# Patient Record
Sex: MALE | Race: WHITE | NOT HISPANIC OR LATINO
[De-identification: names, ages, dates, MRNs, and addresses within clinical notes are randomized per-mention and may not be internally consistent; named-entity substitution may affect disease eponyms.]

---

## 2019-12-27 ENCOUNTER — APPOINTMENT (OUTPATIENT)
Dept: ORTHOPEDIC SURGERY | Facility: CLINIC | Age: 64
End: 2019-12-27
Payer: COMMERCIAL

## 2019-12-27 VITALS — HEIGHT: 69 IN | BODY MASS INDEX: 23.4 KG/M2 | WEIGHT: 158 LBS

## 2019-12-27 PROCEDURE — 99203 OFFICE O/P NEW LOW 30 MIN: CPT

## 2019-12-27 PROCEDURE — 72040 X-RAY EXAM NECK SPINE 2-3 VW: CPT

## 2019-12-27 RX ORDER — METHYLPREDNISOLONE 4 MG/1
4 TABLET ORAL
Qty: 1 | Refills: 0 | Status: ACTIVE | COMMUNITY
Start: 2019-12-27 | End: 1900-01-01

## 2019-12-27 NOTE — DISCUSSION/SUMMARY
[de-identified] : Discussion had with patient - patient with worsening cervical radiculopathy over past 2 months, no relief with NSAIDS, will add Medrol, PT and order MRI.  \par Patient will follow up with spine after MRI \par Patient aware must follow up with MD for further eval and treatment \par Patient will start Physical Therapy \par Patients questions were answered and patient is satisfied with today's visit\par

## 2019-12-27 NOTE — PHYSICAL EXAM
[UE/LE] : Sensory: Intact in bilateral upper & lower extremities [ALL] : Biceps, brachioradialis, triceps, patellar, ankle and plantar 2+ and symmetric bilaterally [Normal] : Oriented to person, place, and time, insight and judgement were intact and the affect was normal [Poor Appearance] : well-appearing [Acute Distress] : not in acute distress [de-identified] : Gait Steady \par \par Spine \par No bony tenderness, no step-offs, \par \par Cervical  ROM \par Flexion 50 degrees\par Extension 60 Degrees \par Rotation 80 degrees\par Lateral bend 45 degrees\par \par Spurlings Test\par \par Upper Extremities - Reflexes 2 +, \par Strength\par Shoulder Abduction - 5/5\par Elbow Flexion 5/5\par Elbow Extension 5/5\par Wrist Flexion 5/5\par Wrist Extension 5/5\par  strength 5/5\par Finger Abduction 5/5\par Bautista's test negative \par Sensation intact and equal to light touch bilaterally\par Distal pulses intact \par \par \par  [de-identified] : 2 view cervical spine reveals degenerative changes, scoliosis

## 2019-12-27 NOTE — HISTORY OF PRESENT ILLNESS
[Pain Location] : pain [de-identified] : Patient is a 64 year old male who presents for cervical pain with radiation to both arms. patient states symptoms started gradually and mild intensity 2 months ago increased in severity this week. patient using advil with no relief. patient states movement increases pain.  no weakness to upper extremities.  patient has had HNP in past

## 2019-12-29 ENCOUNTER — FORM ENCOUNTER (OUTPATIENT)
Age: 64
End: 2019-12-29

## 2019-12-30 ENCOUNTER — APPOINTMENT (OUTPATIENT)
Dept: ORTHOPEDIC SURGERY | Facility: CLINIC | Age: 64
End: 2019-12-30

## 2019-12-30 ENCOUNTER — APPOINTMENT (OUTPATIENT)
Dept: MRI IMAGING | Facility: CLINIC | Age: 64
End: 2019-12-30
Payer: COMMERCIAL

## 2019-12-30 ENCOUNTER — OUTPATIENT (OUTPATIENT)
Dept: OUTPATIENT SERVICES | Facility: HOSPITAL | Age: 64
LOS: 1 days | End: 2019-12-30
Payer: COMMERCIAL

## 2019-12-30 DIAGNOSIS — M54.12 RADICULOPATHY, CERVICAL REGION: ICD-10-CM

## 2019-12-30 PROCEDURE — 72141 MRI NECK SPINE W/O DYE: CPT | Mod: 26

## 2019-12-30 PROCEDURE — 72141 MRI NECK SPINE W/O DYE: CPT

## 2020-01-06 ENCOUNTER — APPOINTMENT (OUTPATIENT)
Dept: ORTHOPEDIC SURGERY | Facility: CLINIC | Age: 65
End: 2020-01-06
Payer: COMMERCIAL

## 2020-01-06 DIAGNOSIS — M48.02 SPINAL STENOSIS, CERVICAL REGION: ICD-10-CM

## 2020-01-06 PROCEDURE — 99214 OFFICE O/P EST MOD 30 MIN: CPT

## 2020-01-06 RX ORDER — DICLOFENAC SODIUM 75 MG/1
75 TABLET, DELAYED RELEASE ORAL
Qty: 30 | Refills: 0 | Status: ACTIVE | COMMUNITY
Start: 2020-01-06 | End: 1900-01-01

## 2020-01-06 NOTE — PHYSICAL EXAM
[Normal] : Gait: normal [Bautista's Sign] : negative Bautista's sign [Pronator Drift] : negative pronator drift [SLR] : negative straight leg raise [de-identified] : 5 out of 5 motor strength, sensation is intact and symmetrical full range of motion flexion extension and rotation, no palpatory tenderness full range of motion of hips knees shoulders and elbows (all four extremities), no atrophy, negative straight leg raise, no pathological reflexes, no swelling, normal ambulation, no apparent distress skin is intact, no palpable lymph nodes, no upper or lower extremity instability, alert and oriented x3 and normal mood. Normal finger-to nose test. No upper findings.\par  [de-identified] : \par EXAM: MR SPINE CERVICAL \par \par \par PROCEDURE DATE: 12/30/2019 \par \par \par \par INTERPRETATION: Clinical indication: Cervical radiculopathy. \par \par MRI of the cervical spine was performed using sagittal T1-T2 and STIR \par sequence. Axial T2 and gradient echo sequences performed as well. \par \par Reversal of the normal cervical lordosis is seen. \par \par The vertebral body height alignment appear normal \par \par Disc desiccation is seen secondary to degenerative changes \par \par C2-3: Disc bulge and central disc protrusion is seen. Bilateral hypertrophic \par facet joint changes are seen. No significant compromise of the spinal canal \par or either neural foramen \par \par C3-4: Disc bulge and central disc protrusion is seen. Bilateral hypertrophic \par facet joint changes are seen. Mild narrowing of the spinal canal. Moderate \par narrowing of the right neural foramen and moderate to severe narrowing of \par the left neural foramen. \par \par C4-5: Disc bulge and bilateral hypertrophic facet joint changes seen. Mild \par narrowing of the spinal canal. Mild narrowing left neural foramen and \par moderate to severe narrowing of the right neural foramen \par \par C5-6: Disc bulge is seen. Far lateral disc herniation is seen on the left \par side. Mild to moderate narrowing of the right neural foramen and moderate to \par severe narrowing of the left neural foramen. \par \par C6-7: Disc bulge is seen. Far lateral disc herniation is seen on the left \par side. Effacement of the ventral thecal sac and ventral spinal cord is seen. \par Mild to moderate narrowing of the spinal canal is seen. Moderate to severe \par narrowing of the right neural foramen and severe narrowing of the left \par neural foramen \par \par C7-T1: Small far lateral disc herniation is seen on the left side. Severe \par narrowing of the left neural foramen is seen. \par \par Evaluation of the spinal cord demonstrates prominence of the central canal \par at the C5-6 level. \par \par Evaluation of the paraspinal soft tissues demonstrate prominence of the \par aryepiglottic folds bilaterally right greater left. No discrete lesion is \par seen, though direct visualization is recommended to rule out underlying mass. \par \par Impression: Disc bulges, herniations as well as degenerative changes as \par described above. \par \par Prominence of the area epiglottic folds as described above. \par \par \par \par \par \par \par \par \par KATHY VERA M.D., ATTENDING RADIOLOGIST \par This document has been electronically signed. Jan 2 2020 11:24AM \par \par \par \par \par \par \par \par \par \par    \par \par \par \par \par \par \par \par \par \par \par \par \par \par \par \par      \par \par

## 2020-01-06 NOTE — DISCUSSION/SUMMARY
[de-identified] : cervical radiculopathy\par resolved after steroids\par discussed all options\par Voltaren PRN and cervical brochure.\par We discussed all options.\par All questions were answered, all alternatives discussed and the patient is in complete agreement with that plan. Follow-up appointment as instructed. Any issues and the patient will call or come in sooner.\par

## 2020-01-06 NOTE — HISTORY OF PRESENT ILLNESS
[Improving] : improving [de-identified] : 64 year old male who presents for cervical pain with radiation to both arms, presented on 12/27 with Tee MALIK.\par Has MRI cervical spine in PACS \par cervical pain with radiation to both arms.\par patient states symptoms started gradually and mild intensity \par patient using advil with no relief. movement increases pain. \par no weakness to upper extremities. \par Better now\par here to review MRI cervical\par Has a had a few sessions of PT-- mild relief \par No fever chills sweats nausea vomiting no bowel or bladder dysfunction, no recent weight loss or gain no night pain. This history is in addition to the intake form that I personally reviewed. \par \par \par

## 2020-01-17 RX ORDER — TIZANIDINE 4 MG/1
4 TABLET ORAL 3 TIMES DAILY
Qty: 90 | Refills: 0 | Status: ACTIVE | COMMUNITY
Start: 2020-01-17 | End: 1900-01-01

## 2020-01-17 RX ORDER — NAPROXEN 500 MG/1
500 TABLET ORAL
Qty: 60 | Refills: 1 | Status: ACTIVE | COMMUNITY
Start: 2020-01-17 | End: 1900-01-01

## 2020-01-17 RX ORDER — DICLOFENAC SODIUM 75 MG/1
75 TABLET, DELAYED RELEASE ORAL
Qty: 60 | Refills: 2 | Status: ACTIVE | COMMUNITY
Start: 2020-01-17 | End: 1900-01-01

## 2020-01-22 ENCOUNTER — APPOINTMENT (OUTPATIENT)
Dept: ORTHOPEDIC SURGERY | Facility: CLINIC | Age: 65
End: 2020-01-22
Payer: COMMERCIAL

## 2020-01-22 VITALS
DIASTOLIC BLOOD PRESSURE: 88 MMHG | HEART RATE: 72 BPM | WEIGHT: 160 LBS | BODY MASS INDEX: 23.7 KG/M2 | SYSTOLIC BLOOD PRESSURE: 148 MMHG | HEIGHT: 69 IN

## 2020-01-22 DIAGNOSIS — M54.12 RADICULOPATHY, CERVICAL REGION: ICD-10-CM

## 2020-01-22 PROCEDURE — 99204 OFFICE O/P NEW MOD 45 MIN: CPT

## 2020-01-22 RX ORDER — PREDNISONE 50 MG/1
50 TABLET ORAL DAILY
Qty: 5 | Refills: 0 | Status: ACTIVE | COMMUNITY
Start: 2020-01-22 | End: 1900-01-01

## 2020-01-22 RX ORDER — GABAPENTIN 300 MG/1
300 CAPSULE ORAL
Qty: 30 | Refills: 0 | Status: ACTIVE | COMMUNITY
Start: 2020-01-22 | End: 1900-01-01

## 2020-01-22 NOTE — DISCUSSION/SUMMARY
[de-identified] : Discussed findings of today's exam and possible causes of patient's pain.  Educated patient on their most probable diagnosis of cervical radiculopathy.  He has tried multiple medications which are not helpful.  Surgery is not indicated at this time per spine surgery.   Reviewed possible courses of treatment, and we collaboratively decided best course of treatment at this time will include starting another 5 day course of prednisone,  gabapentin 300mg at night for nerve pain, as well as referral to interventional spine for discussion of epidural injections.  Follow up in 4 weeks\par \par Brooklynn Bazan MD, EdM\par Sports Medicine [facility]\par \par \par \par Michelle Strange ATC assisted with history taking and documentation for this encounter

## 2020-01-22 NOTE — PHYSICAL EXAM
[de-identified] : Inspection reveals no lesions \par Range of motion of the c spine is limited in rotation\par + tenderness to palpation of right cervical  and thoracic paraspinal muscles. \par no TTP  of the bilateral trapezius, splenius capitus, levator, rhomboid\par NEURO - Normal bulk and tone \par UE strength 5/5 including shoulder abduction, biceps, triceps, wrist extensors, finger flexors, interossei, and APB \par Sensation - intact to light touch in bilateral upper extremities. \par UE Reflexes 2+ biceps, triceps, brachioradialis \par no Hoffmans\par Coordination was age appropriate and intact in all 4 limbs. \par GAIT - Normal base, normal stride length, non-antalgic \par \par \par \par  [de-identified] : Patient comes to today's visit with outside imaging already performed.  I reviewed the images in detail with the patient and discussed the findings as highlighted below. \par \par MRI cervical spine\par \par PROCEDURE DATE: 12/30/2019 \par  \par INTERPRETATION: Clinical indication: Cervical radiculopathy. \par MRI of the cervical spine was performed using sagittal T1-T2 and STIR sequence. Axial T2 and \par gradient echo sequences performed as well. \par Reversal of the normal cervical lordosis is seen. \par The vertebral body height alignment appear normal \par Disc desiccation is seen secondary to degenerative changes \par C2-3: Disc bulge and central disc protrusion is seen. Bilateral hypertrophic facet joint changes are \par seen. No significant compromise of the spinal canal or either neural foramen \par C3-4: Disc bulge and central disc protrusion is seen. Bilateral hypertrophic facet joint changes are \par seen. Mild narrowing of the spinal canal. Moderate narrowing of the right neural foramen and \par moderate to severe narrowing of the left neural foramen. \par C4-5: Disc bulge and bilateral hypertrophic facet joint changes seen. Mild narrowing of the spinal \par canal. Mild narrowing left neural foramen and moderate to severe narrowing of the right neural \par foramen\par pgy\par C5-6: Disc bulge is seen. Far lateral disc herniation is seen on the left side. Mild to moderate \par narrowing of the right neural foramen and moderate to severe narrowing of the left neural \par foramen. \par C6-7: Disc bulge is seen. Far lateral disc herniation is seen on the left side. Effacement of the ventral \par thecal sac and ventral spinal cord is seen. Mild to moderate narrowing of the spinal canal is seen. \par Moderate to severe narrowing of the right neural foramen and severe narrowing of the left neural \par foramen \par C7-T1: Small far lateral disc herniation is seen on the left side. Severe narrowing of the left neural\par foramen is seen. \par Evaluation of the spinal cord demonstrates prominence of the central canal at the C5-6 level. \par Evaluation of the paraspinal soft tissues demonstrate prominence of the aryepiglottic folds \par bilaterally right greater left. No discrete lesion is seen, though direct visualization is recommended\par to rule out underlying mass. \par Impression: Disc bulges, herniations as well as degenerative changes as described above. \par Prominence of the area epiglottic folds as described above.

## 2020-01-22 NOTE — DISCUSSION/SUMMARY
[de-identified] : Discussed findings of today's exam and possible causes of patient's pain.  Educated patient on their most probable diagnosis of cervical radiculopathy.  He has tried multiple medications which are not helpful.  Surgery is not indicated at this time per spine surgery.   Reviewed possible courses of treatment, and we collaboratively decided best course of treatment at this time will include starting another 5 day course of prednisone,  gabapentin 300mg at night for nerve pain, as well as referral to interventional spine for discussion of epidural injections.  Follow up in 4 weeks\par \par Brooklynn Bazan MD, EdM\par Sports Medicine [facility]\par \par \par \par Michelle Strange ATC assisted with history taking and documentation for this encounter

## 2020-01-22 NOTE — HISTORY OF PRESENT ILLNESS
[___ mths] : [unfilled] month(s) ago [9] : a maximum pain level of 9/10 [Constant] : ~He/She~ states the symptoms seem to be constant [de-identified] : Patient is a 64 year old male presenting with burning pain in his neck to mid scapula. . Pain has been present for many months, but ntensified in the last four days to a burning pain 9 out of 10. No previous injury to neck or shoulder. He has tried numerous medications including NSAIDs, muscle relaxants, and prednisone.  Prednisone helps for a few days.. Previous x-rays and MRI have shown multilevel degenerative disease and disc bulging.  There is numbness down his right arm to finger tips for 5th and 4th digit. Burning pain increased last night and prevented patient from sleeping. Pain acutely worsened after an airplane trip in last month.  He has seen spine surgery and orthopedic surgery about the pain.  Denies weakness of the hand or arm. [Bending] : not worsened by bending [Direct Pressure] : not worsened by direct pressure [Lifting] : not worsened by lifting [Sitting] : not worsened by sitting

## 2020-01-22 NOTE — PHYSICAL EXAM
[de-identified] : Inspection reveals no lesions \par Range of motion of the c spine is limited in rotation\par + tenderness to palpation of right cervical  and thoracic paraspinal muscles. \par no TTP  of the bilateral trapezius, splenius capitus, levator, rhomboid\par NEURO - Normal bulk and tone \par UE strength 5/5 including shoulder abduction, biceps, triceps, wrist extensors, finger flexors, interossei, and APB \par Sensation - intact to light touch in bilateral upper extremities. \par UE Reflexes 2+ biceps, triceps, brachioradialis \par no Hoffmans\par Coordination was age appropriate and intact in all 4 limbs. \par GAIT - Normal base, normal stride length, non-antalgic \par \par \par \par  [de-identified] : Patient comes to today's visit with outside imaging already performed.  I reviewed the images in detail with the patient and discussed the findings as highlighted below. \par \par MRI cervical spine\par \par PROCEDURE DATE: 12/30/2019 \par  \par INTERPRETATION: Clinical indication: Cervical radiculopathy. \par MRI of the cervical spine was performed using sagittal T1-T2 and STIR sequence. Axial T2 and \par gradient echo sequences performed as well. \par Reversal of the normal cervical lordosis is seen. \par The vertebral body height alignment appear normal \par Disc desiccation is seen secondary to degenerative changes \par C2-3: Disc bulge and central disc protrusion is seen. Bilateral hypertrophic facet joint changes are \par seen. No significant compromise of the spinal canal or either neural foramen \par C3-4: Disc bulge and central disc protrusion is seen. Bilateral hypertrophic facet joint changes are \par seen. Mild narrowing of the spinal canal. Moderate narrowing of the right neural foramen and \par moderate to severe narrowing of the left neural foramen. \par C4-5: Disc bulge and bilateral hypertrophic facet joint changes seen. Mild narrowing of the spinal \par canal. Mild narrowing left neural foramen and moderate to severe narrowing of the right neural \par foramen\par pgy\par C5-6: Disc bulge is seen. Far lateral disc herniation is seen on the left side. Mild to moderate \par narrowing of the right neural foramen and moderate to severe narrowing of the left neural \par foramen. \par C6-7: Disc bulge is seen. Far lateral disc herniation is seen on the left side. Effacement of the ventral \par thecal sac and ventral spinal cord is seen. Mild to moderate narrowing of the spinal canal is seen. \par Moderate to severe narrowing of the right neural foramen and severe narrowing of the left neural \par foramen \par C7-T1: Small far lateral disc herniation is seen on the left side. Severe narrowing of the left neural\par foramen is seen. \par Evaluation of the spinal cord demonstrates prominence of the central canal at the C5-6 level. \par Evaluation of the paraspinal soft tissues demonstrate prominence of the aryepiglottic folds \par bilaterally right greater left. No discrete lesion is seen, though direct visualization is recommended\par to rule out underlying mass. \par Impression: Disc bulges, herniations as well as degenerative changes as described above. \par Prominence of the area epiglottic folds as described above.

## 2020-01-22 NOTE — HISTORY OF PRESENT ILLNESS
[___ mths] : [unfilled] month(s) ago [9] : a maximum pain level of 9/10 [Constant] : ~He/She~ states the symptoms seem to be constant [de-identified] : Patient is a 64 year old male presenting with burning pain in his neck to mid scapula. . Pain has been present for many months, but ntensified in the last four days to a burning pain 9 out of 10. No previous injury to neck or shoulder. He has tried numerous medications including NSAIDs, muscle relaxants, and prednisone.  Prednisone helps for a few days.. Previous x-rays and MRI have shown multilevel degenerative disease and disc bulging.  There is numbness down his right arm to finger tips for 5th and 4th digit. Burning pain increased last night and prevented patient from sleeping. Pain acutely worsened after an airplane trip in last month.  He has seen spine surgery and orthopedic surgery about the pain.  Denies weakness of the hand or arm. [Bending] : not worsened by bending [Direct Pressure] : not worsened by direct pressure [Lifting] : not worsened by lifting [Sitting] : not worsened by sitting

## 2020-01-27 ENCOUNTER — APPOINTMENT (OUTPATIENT)
Dept: ORTHOPEDIC SURGERY | Facility: CLINIC | Age: 65
End: 2020-01-27

## 2020-02-26 ENCOUNTER — APPOINTMENT (OUTPATIENT)
Dept: OTOLARYNGOLOGY | Facility: CLINIC | Age: 65
End: 2020-02-26